# Patient Record
Sex: MALE
[De-identification: names, ages, dates, MRNs, and addresses within clinical notes are randomized per-mention and may not be internally consistent; named-entity substitution may affect disease eponyms.]

---

## 2022-11-30 ENCOUNTER — APPOINTMENT (OUTPATIENT)
Dept: ORTHOPEDIC SURGERY | Facility: CLINIC | Age: 42
End: 2022-11-30

## 2022-11-30 VITALS — HEIGHT: 67 IN | BODY MASS INDEX: 28.56 KG/M2 | WEIGHT: 182 LBS

## 2022-11-30 DIAGNOSIS — M77.11 LATERAL EPICONDYLITIS, RIGHT ELBOW: ICD-10-CM

## 2022-11-30 DIAGNOSIS — M25.832 OTHER SPECIFIED JOINT DISORDERS, LEFT WRIST: ICD-10-CM

## 2022-11-30 PROBLEM — Z00.00 ENCOUNTER FOR PREVENTIVE HEALTH EXAMINATION: Status: ACTIVE | Noted: 2022-11-30

## 2022-11-30 PROCEDURE — 99203 OFFICE O/P NEW LOW 30 MIN: CPT

## 2022-11-30 PROCEDURE — 73110 X-RAY EXAM OF WRIST: CPT | Mod: LT

## 2022-11-30 NOTE — IMAGING
[de-identified] : full active range of motion of the right shoulder, wrist and fingers\par full active range of motion of the right elbow\par Tenderness to palpation of the lateral epicondyle and proximal extensor supinator mass\par pain with resisted wrist extension and forearm supination\par 4/5 strength in supination and wrist extension, otherwise 5/5 strength\par median/ulnar/radial sensation intact to light touch\par ain/pin/ulnar motor intact\par palpable pulses CR<2s\par \par left wrist:\par no swelling/ecchymosis\par most ttp over SL\par mild ttp over TFCC\par farom\par nvid [Left] : left wrist [There are no fractures, subluxations or dislocations. No significant abnormalities are seen] : There are no fractures, subluxations or dislocations. No significant abnormalities are seen

## 2022-11-30 NOTE — HISTORY OF PRESENT ILLNESS
[Dull/Aching] : dull/aching [Sharp] : sharp [Constant] : constant [de-identified] : 11/30/22:  Pt has had forearm pain for 6 months and intermittent left wrist pain. [] : no [FreeTextEntry1] : RT forearm and LT Wrist  [FreeTextEntry5] : Pain in the RT forearrm started 6 months ago. Pain in the LEft wrist started 2 months ago. Pt denies traumatic injury. Pt Denies N/T. LT Wrist is 6/10, RT Forearm is 4/10